# Patient Record
Sex: FEMALE | Race: WHITE | Employment: OTHER | URBAN - METROPOLITAN AREA
[De-identification: names, ages, dates, MRNs, and addresses within clinical notes are randomized per-mention and may not be internally consistent; named-entity substitution may affect disease eponyms.]

---

## 2018-04-11 PROBLEM — E11.65 TYPE 2 DIABETES MELLITUS WITH HYPERGLYCEMIA, WITHOUT LONG-TERM CURRENT USE OF INSULIN (HCC): Status: ACTIVE | Noted: 2018-04-11

## 2018-04-11 PROBLEM — I10 ESSENTIAL HYPERTENSION: Status: ACTIVE | Noted: 2018-04-11

## 2018-08-09 PROBLEM — E03.9 ACQUIRED HYPOTHYROIDISM: Status: ACTIVE | Noted: 2018-08-09

## 2022-06-02 ENCOUNTER — HOSPITAL ENCOUNTER (OUTPATIENT)
Facility: HOSPITAL | Age: 67
Setting detail: HOSPITAL OUTPATIENT SURGERY
Discharge: HOME | End: 2022-06-02
Attending: ORTHOPAEDIC SURGERY | Admitting: ORTHOPAEDIC SURGERY
Payer: MEDICARE

## 2022-06-02 VITALS
WEIGHT: 239 LBS | OXYGEN SATURATION: 98 % | HEART RATE: 100 BPM | TEMPERATURE: 98 F | BODY MASS INDEX: 38.41 KG/M2 | DIASTOLIC BLOOD PRESSURE: 86 MMHG | HEIGHT: 66 IN | RESPIRATION RATE: 18 BRPM | SYSTOLIC BLOOD PRESSURE: 144 MMHG

## 2022-06-02 DIAGNOSIS — Z20.822 ENCOUNTER FOR PREOPERATIVE SCREENING LABORATORY TESTING FOR COVID-19 VIRUS: Primary | ICD-10-CM

## 2022-06-02 DIAGNOSIS — Z01.812 ENCOUNTER FOR PREOPERATIVE SCREENING LABORATORY TESTING FOR COVID-19 VIRUS: Primary | ICD-10-CM

## 2022-06-02 LAB — GLUCOSE BLD-MCNC: 100 MG/DL (ref 70–99)

## 2022-06-02 PROCEDURE — 0LN70ZZ RELEASE RIGHT HAND TENDON, OPEN APPROACH: ICD-10-PCS | Performed by: ORTHOPAEDIC SURGERY

## 2022-06-02 PROCEDURE — 82962 GLUCOSE BLOOD TEST: CPT

## 2022-06-02 RX ORDER — ERGOCALCIFEROL (VITAMIN D2) 1250 MCG
50000 CAPSULE ORAL DAILY
COMMUNITY

## 2022-06-02 RX ORDER — LIDOCAINE HYDROCHLORIDE AND EPINEPHRINE 10; 10 MG/ML; UG/ML
INJECTION, SOLUTION INFILTRATION; PERINEURAL AS NEEDED
Status: DISCONTINUED | OUTPATIENT
Start: 2022-06-02 | End: 2022-06-02

## 2022-06-02 NOTE — OPERATIVE REPORT
[facility]    PATIENT'S NAME: [facility]   ATTENDING PHYSICIAN: Kristi Paige M.D. OPERATING PHYSICIAN: Kristi Paige M.D. PATIENT ACCOUNT#:   [de-identified]    LOCATION:  [address] #:   [id number]       YOB: 1955  ADMISSION DATE:       06/02/2022      OPERATION DATE:  06/02/2022    OPERATIVE REPORT      PREOPERATIVE DIAGNOSIS:  Right trigger thumb. POSTOPERATIVE DIAGNOSIS:  Right trigger thumb. PROCEDURE:  Right trigger thumb release. ANESTHESIA:  Local.    ESTIMATED BLOOD LOSS:  Minimal.    TOURNIQUET TIME:  Zero. SPECIMENS:  None. COMPLICATIONS:  None. DISPOSITION:  Fair condition to the recovery room. PLAN:  The patient can begin immediate range of motion exercises. INDICATIONS:  The patient is a 26-year-old female with history of a right trigger thumb. The thumb was essentially locked. She was therefore offered surgical intervention. The risks and benefits of the procedure were discussed in detail with the patient including the risk of stiffness, chronic pain, skin healing problems, infection. She shows good understanding of these issues and wished to proceed with surgery. FINDINGS:  Consistent with diagnosis. OPERATIVE TECHNIQUE:  On the date of operation, I saw the patient in the holding room and initialed the surgical site. The patient was given a local block using lidocaine with epinephrine. She was taken to the operating room and was placed supine on the OR table. The right upper extremity was prepped and draped in standard surgical fashion. A surgical time-out was taken during which the correct patient, surgical site, and procedure were verified. An incision in the proximal aspect of the palm in the proximal crease was performed. Dissection was carried down through soft tissue, and full-thickness skin flaps were raised. The A1 pulley was visualized.   Under direct visualization, the A1 pulley was divided in a proximal to distal fashion. Following this, the patient could fully flex and fully extend the thumb without difficulty. The wound was copiously irrigated, and the skin flaps were closed with horizontal mattress 4-0 nylon suture. Sterile bulky dressings were applied. The patient was taken to the recovery room in fair condition. She will be sent home with postoperative written and oral instructions, as well as oral narcotics for postoperative pain. She will follow up in 1 week for a wound check.       Dictated By Adela Manuel M.D.  d: 06/02/2022 09:47:24  t: 06/02/2022 18:03:17  [facility] [id number]  /

## 2022-06-02 NOTE — BRIEF OP NOTE
Pre-Operative Diagnosis: TRIGGR THUMB OF RIGHT HAND     Post-Operative Diagnosis: TRIGGR THUMB OF RIGHT HAND      Procedure Performed:   RIGHT TRIGGER THUMB RELEASE    Surgeon(s) and Role:     Margi Keys MD - Primary    Assistant(s):        Surgical Findings: c/w dx     Specimen: none     Estimated Blood Loss: Blood Output: 0.5 mL (6/2/2022  8:05 AM)      Dictation Number:       Katey Hardin MD  6/2/2022  8:13 AM

## 2022-09-22 ENCOUNTER — HOSPITAL ENCOUNTER (OUTPATIENT)
Facility: HOSPITAL | Age: 67
Setting detail: HOSPITAL OUTPATIENT SURGERY
Discharge: HOME | End: 2022-09-22
Attending: ORTHOPAEDIC SURGERY | Admitting: ORTHOPAEDIC SURGERY

## 2022-09-22 VITALS
BODY MASS INDEX: 37.45 KG/M2 | TEMPERATURE: 99 F | SYSTOLIC BLOOD PRESSURE: 115 MMHG | HEART RATE: 93 BPM | DIASTOLIC BLOOD PRESSURE: 76 MMHG | RESPIRATION RATE: 20 BRPM | HEIGHT: 66 IN | OXYGEN SATURATION: 99 % | WEIGHT: 233 LBS

## 2022-09-22 PROCEDURE — 0LN50ZZ RELEASE RIGHT LOWER ARM AND WRIST TENDON, OPEN APPROACH: ICD-10-PCS | Performed by: ORTHOPAEDIC SURGERY

## 2022-09-22 PROCEDURE — 01N50ZZ RELEASE MEDIAN NERVE, OPEN APPROACH: ICD-10-PCS | Performed by: ORTHOPAEDIC SURGERY

## 2022-09-22 NOTE — BRIEF OP NOTE
Pre-Operative Diagnosis: DEQUERVAINS TENOSYNOVITIS, CARPAL TUNNEL SYNDROME RIGHT WRIST     Post-Operative Diagnosis: DEQUERVAINS TENOSYNOVITIS, CARPAL TUNNEL SYNDROME RIGHT WRIST      Procedure Performed:   RIGHT WRIST FIRST EXTENSOR COMPARTMENT RELEASE AND RIGHT CARPAL TUNNEL RELEASE    Surgeon(s) and Role:     Terrance Figueroa MD - Primary    Assistant(s):        Surgical Findings: c/w dx     Specimen: none     Estimated Blood Loss: No data recorded    Dictation Number:       Nicole Wan MD  9/22/2022  6:06 PM

## 2022-09-23 NOTE — OPERATIVE REPORT
[facility]    PATIENT'S NAME: Salud Farah   ATTENDING PHYSICIAN: Philly Costello M.D. OPERATING PHYSICIAN: Philly Costello M.D. PATIENT ACCOUNT#:   [de-identified]    LOCATION:  [facility]  MEDICAL RECORD #:   [id number]       YOB: 1955  ADMISSION DATE:       09/22/2022      OPERATION DATE:  09/22/2022    OPERATIVE REPORT    PREOPERATIVE DIAGNOSIS:  Right-sided carpal tunnel syndrome and right de Quervain tenosynovitis. POSTOPERATIVE DIAGNOSIS:  Right-sided carpal tunnel syndrome and right de Quervain tenosynovitis. PROCEDURE:  Right carpal tunnel release and right first extensor compartment release. ANESTHESIA:  Local.    ESTIMATED BLOOD LOSS:  Minimal.    TOURNIQUET TIME:  Zero. SPECIMENS:  None. COMPLICATIONS:  None. DISPOSITION:  Fair condition to the recovery room. PLAN:  Patient can begin immediate range of motion exercises. INDICATIONS:  The patient is a 57-year-old female with a history of numbness, tingling in the right hand. She had test findings that did show evidence of carpal tunnel syndrome. She also had difficulty with range of motion of the thumb and had evidence of de Quervain tenosynovitis. She failed nonoperative options for that and was also offered surgical intervention. The risks, benefits of procedure were discussed in detail with the patient including risk of palsy of the superficial branch of radial nerve, incomplete and delayed recovery of nerve function, stiffness, and infection. She shows good understanding of these issues and wished to proceed with surgery. FINDINGS:  Consistent with diagnosis. OPERATIVE TECHNIQUE:  On date of operation, I saw the patient in holding room and initialed the surgical sites. The patient was given a local block using lidocaine with epinephrine. She was taken to the operating room and was placed supine on the OR table.   The right upper extremity was prepped and draped in standard surgical fashion. A surgical time-out was taken during which the correct patient, surgical site, and procedure were verified. Attention was turned to the first extensor compartment. A transverse incision overlying the first extensor compartment, roughly 1 cm proximal to the tip of the radial styloid was performed. Dissection was carried down through the soft tissue and full-thickness skin flaps were raised. The branches of the superficial branch of radial nerve were identified and were preserved throughout the case. The first extensor compartment was visualized. This was then opened along its dorsal margin, going from proximal to distal.  There was some inflamed synovial tissue which was debrided. The extensor pollicis brevis was identified and was completely released as well, although there did not appear to be a separate subsheath for this. Following this, the patient could move the thumb without difficulty, and can move the wrist and the tendons were stable. The wound was copiously irrigated and the skin flaps were closed with horizontal mattress 4-0 nylon suture. Attention was turned to the carpal tunnel release. A longitudinal incision in the proximal aspect of the palm paralleling the thenar crease was performed. Dissection was carried down through the soft tissue and full-thickness skin flaps were raised. The palmar aponeurosis was divided longitudinally and the underlying transcarpal ligament was identified. The distal fibers were divided with a 15 blade. The guide for the Integra carpal tunnel was passed deep to the transcarpal ligament. The ligament was divided in a distal to proximal fashion. The contents of the carpal tunnel were inspected and were found to be intact. The wound was copiously irrigated and the skin flaps were closed with horizontal mattress 4-0 nylon suture. Sterile bulky dressings were applied. The patient was taken to the recovery room in fair condition.   She will be sent home with postop written and oral instructions as well as oral narcotics for postop pain. She will follow up in 1 week for wound check.     Dictated By Katerin Thurston M.D.  d: 09/22/2022 18:14:49  t: 09/22/2022 23:29:05  Radha Vallejo [id number]  YZ/

## 2022-10-18 ENCOUNTER — TELEPHONE (OUTPATIENT)
Facility: CLINIC | Age: 67
End: 2022-10-18

## 2022-10-18 DIAGNOSIS — G47.33 OSA (OBSTRUCTIVE SLEEP APNEA): Primary | ICD-10-CM

## 2022-10-18 NOTE — TELEPHONE ENCOUNTER
Pt was instructed to try a new mask under [employer] in June 2022 but patient reports [employer] cancelled our order due to missing information. She didn't have further details to provide. Shawn - please check with [employer] as to the issue? Pt needs to try new mask in order to use device and establish compliance. Last visit 6/2022 w/RB. Was previously set-up with [employer] already. [employer]

## (undated) DEVICE — UPPER EXTREMITY CDS-LF: Brand: MEDLINE INDUSTRIES, INC.

## (undated) DEVICE — 1010 S-DRAPE TOWEL DRAPE 10/BX: Brand: STERI-DRAPE™

## (undated) DEVICE — OCCLUSIVE GAUZE STRIP OVERWRAP,3% BISMUTH TRIBROMOPHENATE IN PETROLATUM BLEND: Brand: XEROFORM

## (undated) DEVICE — STERILE SYNTHETIC POLYISOPRENE POWDER-FREE SURGICAL GLOVES WITH HYDROGEL COATING: Brand: PROTEXIS

## (undated) DEVICE — GAUZE SPONGES,USP TYPE VII GAUZE, 12 PLY: Brand: CURITY

## (undated) DEVICE — STERILE POLYISOPRENE POWDER-FREE SURGICAL GLOVES: Brand: PROTEXIS

## (undated) DEVICE — SYRINGE 10ML LL TIP

## (undated) DEVICE — STANDARD HYPODERMIC NEEDLE,POLYPROPYLENE HUB: Brand: MONOJECT

## (undated) DEVICE — MINI-BLADE®: Brand: BEAVER®

## (undated) DEVICE — GOWN AERO CHROME XXL

## (undated) DEVICE — GAUZE SPONGES,12 PLY: Brand: CURITY

## (undated) DEVICE — SOLUTION  .9 1000ML BTL

## (undated) DEVICE — PADDING CAST COTTON  4

## (undated) DEVICE — MEGADYNE ELECTRODE ADULT PT RT

## (undated) DEVICE — PADDING CAST SOFT ROLL 2" STER

## (undated) DEVICE — DRESSING COBAN 1" TAN

## (undated) DEVICE — KNIFE CARPAL TUNNEL 08-0005

## (undated) DEVICE — CONVERTORS STOCKINETTE: Brand: CONVERTORS

## (undated) DEVICE — SLEEVE KENDALL SCD EXPRESS MED

## (undated) DEVICE — DISPOSABLE TOURNIQUET CUFF SINGLE BLADDER, DUAL PORT AND QUICK CONNECT CONNECTOR: Brand: COLOR CUFF

## (undated) DEVICE — STRETCH BANDAGE ROLL: Brand: DERMACEA

## (undated) DEVICE — SUT ETHILON 5-0 PS-3 1668H

## (undated) DEVICE — SUT ETHILON 4-0 PS-2 1667H

## (undated) DEVICE — CASED DISP BIPOLAR CORD

## (undated) DEVICE — DISPOSABLE TOURNIQUET CUFF DUAL BLADDER, DUAL PORT AND QUICK CONNECT CONNECTOR: Brand: COLOR CUFF

## (undated) DEVICE — HOOK LOCK LATEX FREE ELASTIC BANDAGE 2INX5YD